# Patient Record
Sex: FEMALE | Race: WHITE | NOT HISPANIC OR LATINO
[De-identification: names, ages, dates, MRNs, and addresses within clinical notes are randomized per-mention and may not be internally consistent; named-entity substitution may affect disease eponyms.]

---

## 2017-03-29 ENCOUNTER — APPOINTMENT (OUTPATIENT)
Dept: RADIOLOGY | Facility: CLINIC | Age: 72
End: 2017-03-29

## 2017-04-03 ENCOUNTER — APPOINTMENT (OUTPATIENT)
Dept: RADIOLOGY | Facility: CLINIC | Age: 72
End: 2017-04-03

## 2017-04-03 ENCOUNTER — OUTPATIENT (OUTPATIENT)
Dept: OUTPATIENT SERVICES | Facility: HOSPITAL | Age: 72
LOS: 1 days | End: 2017-04-03
Payer: MEDICARE

## 2017-04-03 DIAGNOSIS — Z00.8 ENCOUNTER FOR OTHER GENERAL EXAMINATION: ICD-10-CM

## 2017-04-03 PROCEDURE — 77080 DXA BONE DENSITY AXIAL: CPT

## 2017-04-13 ENCOUNTER — APPOINTMENT (OUTPATIENT)
Dept: ELECTROPHYSIOLOGY | Facility: CLINIC | Age: 72
End: 2017-04-13

## 2017-04-13 VITALS
BODY MASS INDEX: 24.16 KG/M2 | HEIGHT: 65 IN | SYSTOLIC BLOOD PRESSURE: 128 MMHG | DIASTOLIC BLOOD PRESSURE: 66 MMHG | WEIGHT: 145 LBS | HEART RATE: 69 BPM

## 2017-04-13 DIAGNOSIS — D32.9 BENIGN NEOPLASM OF MENINGES, UNSPECIFIED: ICD-10-CM

## 2017-04-13 DIAGNOSIS — Z86.69 PERSONAL HISTORY OF OTHER DISEASES OF THE NERVOUS SYSTEM AND SENSE ORGANS: ICD-10-CM

## 2017-04-13 DIAGNOSIS — Z87.891 PERSONAL HISTORY OF NICOTINE DEPENDENCE: ICD-10-CM

## 2017-04-13 DIAGNOSIS — Z87.898 PERSONAL HISTORY OF OTHER SPECIFIED CONDITIONS: ICD-10-CM

## 2018-04-13 ENCOUNTER — APPOINTMENT (OUTPATIENT)
Dept: ELECTROPHYSIOLOGY | Facility: CLINIC | Age: 73
End: 2018-04-13

## 2018-04-13 VITALS — WEIGHT: 185 LBS | HEIGHT: 64 IN | BODY MASS INDEX: 31.58 KG/M2

## 2018-06-19 ENCOUNTER — OUTPATIENT (OUTPATIENT)
Dept: OUTPATIENT SERVICES | Facility: HOSPITAL | Age: 73
LOS: 1 days | End: 2018-06-19
Payer: MEDICARE

## 2018-06-19 ENCOUNTER — APPOINTMENT (OUTPATIENT)
Dept: RADIOLOGY | Facility: CLINIC | Age: 73
End: 2018-06-19
Payer: MEDICARE

## 2018-06-19 DIAGNOSIS — Z00.8 ENCOUNTER FOR OTHER GENERAL EXAMINATION: ICD-10-CM

## 2018-06-19 PROCEDURE — 77080 DXA BONE DENSITY AXIAL: CPT | Mod: 26

## 2018-06-19 PROCEDURE — 77080 DXA BONE DENSITY AXIAL: CPT

## 2019-06-05 ENCOUNTER — APPOINTMENT (OUTPATIENT)
Dept: ELECTROPHYSIOLOGY | Facility: CLINIC | Age: 74
End: 2019-06-05

## 2019-06-05 VITALS — SYSTOLIC BLOOD PRESSURE: 130 MMHG | DIASTOLIC BLOOD PRESSURE: 80 MMHG | HEART RATE: 63 BPM | HEIGHT: 64 IN

## 2019-06-28 ENCOUNTER — NON-APPOINTMENT (OUTPATIENT)
Age: 74
End: 2019-06-28

## 2019-06-28 ENCOUNTER — APPOINTMENT (OUTPATIENT)
Dept: ELECTROPHYSIOLOGY | Facility: CLINIC | Age: 74
End: 2019-06-28
Payer: MEDICARE

## 2019-06-28 VITALS
SYSTOLIC BLOOD PRESSURE: 138 MMHG | BODY MASS INDEX: 31.58 KG/M2 | HEART RATE: 68 BPM | WEIGHT: 185 LBS | DIASTOLIC BLOOD PRESSURE: 77 MMHG | HEIGHT: 64 IN

## 2019-06-28 PROCEDURE — 99214 OFFICE O/P EST MOD 30 MIN: CPT

## 2019-06-28 PROCEDURE — 93000 ELECTROCARDIOGRAM COMPLETE: CPT

## 2019-06-28 RX ORDER — CINNAMON BARK 500 MG
CAPSULE ORAL
Refills: 0 | Status: DISCONTINUED | COMMUNITY
End: 2019-06-28

## 2019-06-28 NOTE — PHYSICAL EXAM
[General Appearance - Well Developed] : well developed [Normal Appearance] : normal appearance [Well Groomed] : well groomed [No Deformities] : no deformities [General Appearance - Well Nourished] : well nourished [Normal Conjunctiva] : the conjunctiva exhibited no abnormalities [General Appearance - In No Acute Distress] : no acute distress [Eyelids - No Xanthelasma] : the eyelids demonstrated no xanthelasmas [Normal Oral Mucosa] : normal oral mucosa [No Oral Cyanosis] : no oral cyanosis [Normal Jugular Venous A Waves Present] : normal jugular venous A waves present [No Oral Pallor] : no oral pallor [Normal Jugular Venous V Waves Present] : normal jugular venous V waves present [No Jugular Venous Gonsalez A Waves] : no jugular venous gonsalez A waves [Respiration, Rhythm And Depth] : normal respiratory rhythm and effort [Auscultation Breath Sounds / Voice Sounds] : lungs were clear to auscultation bilaterally [Exaggerated Use Of Accessory Muscles For Inspiration] : no accessory muscle use [Murmurs] : no murmurs present [Heart Sounds] : normal S1 and S2 [Heart Rate And Rhythm] : heart rate and rhythm were normal [Abdomen Mass (___ Cm)] : no abdominal mass palpated [Abdomen Soft] : soft [Abdomen Tenderness] : non-tender [Gait - Sufficient For Exercise Testing] : the gait was sufficient for exercise testing [Abnormal Walk] : normal gait [Nail Clubbing] : no clubbing of the fingernails [Petechial Hemorrhages (___cm)] : no petechial hemorrhages [Cyanosis, Localized] : no localized cyanosis [] : no rash [No Venous Stasis] : no venous stasis [Skin Color & Pigmentation] : normal skin color and pigmentation [Skin Lesions] : no skin lesions [No Skin Ulcers] : no skin ulcer [No Xanthoma] : no  xanthoma was observed [Oriented To Time, Place, And Person] : oriented to person, place, and time [Mood] : the mood was normal [No Anxiety] : not feeling anxious [Affect] : the affect was normal

## 2019-07-29 NOTE — ASSESSMENT
DISCHARGE PLANNING     Discharge to home or other facility with appropriate resources Progressing        INFECTION - ADULT     Absence or prevention of progression during hospitalization Progressing        Knowledge Deficit     Patient/family/caregiver demonstrates understanding of disease process, treatment plan, medications, and discharge instructions Progressing        PAIN - ADULT     Verbalizes/displays adequate comfort level or baseline comfort level Progressing        Potential for Falls     Patient will remain free of falls Progressing        SAFETY ADULT     Maintain or return to baseline ADL function Progressing     Maintain or return mobility status to optimal level Progressing [FreeTextEntry1] : 74 year old female who has palpitations secondary to PACs. Lifestyle modification and beta blocker therapy have been successful in suppressing her symptoms.\par \par At this time would recommend continuation of her baby aspirin and atenolol.

## 2019-07-29 NOTE — HISTORY OF PRESENT ILLNESS
[FreeTextEntry1] : This is a 74-year-old woman with a history of near syncope and palpitations. Evaluation in the past has included Holter monitors that demonstrated her palpitations are associated with single PACs. She presents today for followup feeling well. She reports that she's having infrequent palpitation. LOLITA MORALES  denies chest pain, chest pressure, shortness of breath, lightheadedness, dizziness, syncope, presyncope, orthopnea, PND, or edema..\par

## 2020-06-19 ENCOUNTER — APPOINTMENT (OUTPATIENT)
Dept: ELECTROPHYSIOLOGY | Facility: CLINIC | Age: 75
End: 2020-06-19

## 2020-07-20 ENCOUNTER — APPOINTMENT (OUTPATIENT)
Dept: RADIOLOGY | Facility: CLINIC | Age: 75
End: 2020-07-20
Payer: MEDICARE

## 2020-07-20 ENCOUNTER — OUTPATIENT (OUTPATIENT)
Dept: OUTPATIENT SERVICES | Facility: HOSPITAL | Age: 75
LOS: 1 days | End: 2020-07-20
Payer: MEDICARE

## 2020-07-20 DIAGNOSIS — Z00.8 ENCOUNTER FOR OTHER GENERAL EXAMINATION: ICD-10-CM

## 2020-07-20 PROCEDURE — 77080 DXA BONE DENSITY AXIAL: CPT

## 2020-07-20 PROCEDURE — 77080 DXA BONE DENSITY AXIAL: CPT | Mod: 26

## 2020-07-29 NOTE — OBJECTIVE
regarding LHC/patient [History reviewed] : History reviewed. [Medications and Allergies reviewed] : Medications and allergies reviewed.

## 2020-12-01 ENCOUNTER — NON-APPOINTMENT (OUTPATIENT)
Age: 75
End: 2020-12-01

## 2020-12-01 ENCOUNTER — APPOINTMENT (OUTPATIENT)
Dept: ELECTROPHYSIOLOGY | Facility: CLINIC | Age: 75
End: 2020-12-01
Payer: MEDICARE

## 2020-12-01 VITALS
HEART RATE: 64 BPM | BODY MASS INDEX: 24.75 KG/M2 | SYSTOLIC BLOOD PRESSURE: 145 MMHG | WEIGHT: 145 LBS | OXYGEN SATURATION: 99 % | HEIGHT: 64 IN | DIASTOLIC BLOOD PRESSURE: 79 MMHG

## 2020-12-01 PROCEDURE — 93000 ELECTROCARDIOGRAM COMPLETE: CPT

## 2020-12-01 PROCEDURE — 99214 OFFICE O/P EST MOD 30 MIN: CPT

## 2020-12-01 NOTE — PHYSICAL EXAM
[General Appearance - Well Developed] : well developed [Normal Appearance] : normal appearance [Well Groomed] : well groomed [General Appearance - Well Nourished] : well nourished [No Deformities] : no deformities [General Appearance - In No Acute Distress] : no acute distress [Normal Conjunctiva] : the conjunctiva exhibited no abnormalities [Eyelids - No Xanthelasma] : the eyelids demonstrated no xanthelasmas [Normal Oral Mucosa] : normal oral mucosa [No Oral Pallor] : no oral pallor [No Oral Cyanosis] : no oral cyanosis [Normal Jugular Venous A Waves Present] : normal jugular venous A waves present [Normal Jugular Venous V Waves Present] : normal jugular venous V waves present [No Jugular Venous Gonsalez A Waves] : no jugular venous gonsalez A waves [Respiration, Rhythm And Depth] : normal respiratory rhythm and effort [Exaggerated Use Of Accessory Muscles For Inspiration] : no accessory muscle use [Auscultation Breath Sounds / Voice Sounds] : lungs were clear to auscultation bilaterally [Heart Rate And Rhythm] : heart rate and rhythm were normal [Heart Sounds] : normal S1 and S2 [Murmurs] : no murmurs present [Abdomen Soft] : soft [Abdomen Tenderness] : non-tender [Abdomen Mass (___ Cm)] : no abdominal mass palpated [Abnormal Walk] : normal gait [Gait - Sufficient For Exercise Testing] : the gait was sufficient for exercise testing [Nail Clubbing] : no clubbing of the fingernails [Cyanosis, Localized] : no localized cyanosis [Petechial Hemorrhages (___cm)] : no petechial hemorrhages [Skin Color & Pigmentation] : normal skin color and pigmentation [] : no rash [No Venous Stasis] : no venous stasis [Skin Lesions] : no skin lesions [No Skin Ulcers] : no skin ulcer [No Xanthoma] : no  xanthoma was observed [Oriented To Time, Place, And Person] : oriented to person, place, and time [Affect] : the affect was normal [Mood] : the mood was normal [No Anxiety] : not feeling anxious

## 2020-12-01 NOTE — ASSESSMENT
[FreeTextEntry1] : 75 year old female who has palpitations secondary to PACs. Lifestyle modification and beta blocker therapy have been successful in suppressing her symptoms. She has had an Echo with Dr. Arzate within last year. \par \par At this time would recommend continuation of her baby aspirin and atenolol.

## 2021-02-04 ENCOUNTER — APPOINTMENT (OUTPATIENT)
Dept: GASTROENTEROLOGY | Facility: CLINIC | Age: 76
End: 2021-02-04
Payer: MEDICARE

## 2021-02-04 VITALS
SYSTOLIC BLOOD PRESSURE: 152 MMHG | DIASTOLIC BLOOD PRESSURE: 76 MMHG | BODY MASS INDEX: 24.59 KG/M2 | HEART RATE: 76 BPM | WEIGHT: 144 LBS | HEIGHT: 64 IN

## 2021-02-04 DIAGNOSIS — Z12.11 ENCOUNTER FOR SCREENING FOR MALIGNANT NEOPLASM OF COLON: ICD-10-CM

## 2021-02-04 DIAGNOSIS — Z86.010 PERSONAL HISTORY OF COLONIC POLYPS: ICD-10-CM

## 2021-02-04 PROCEDURE — 99202 OFFICE O/P NEW SF 15 MIN: CPT

## 2021-02-04 NOTE — HISTORY OF PRESENT ILLNESS
[de-identified] : Ms. LOLITA MORALES is a 75 year old female presents for screening colonoscopy. Patient has no complaints of bowel issues, bleeding, abdominal pain, family history of colon cancer, GERD symptoms. Patient had last colonoscopy several years ago. Patient reports personal history of colon polyps.\par \par

## 2021-03-28 ENCOUNTER — APPOINTMENT (OUTPATIENT)
Dept: DISASTER EMERGENCY | Facility: CLINIC | Age: 76
End: 2021-03-28

## 2021-03-28 DIAGNOSIS — Z01.818 ENCOUNTER FOR OTHER PREPROCEDURAL EXAMINATION: ICD-10-CM

## 2021-03-28 LAB — SARS-COV-2 N GENE NPH QL NAA+PROBE: NOT DETECTED

## 2021-04-01 ENCOUNTER — APPOINTMENT (OUTPATIENT)
Dept: GASTROENTEROLOGY | Facility: AMBULATORY MEDICAL SERVICES | Age: 76
End: 2021-04-01
Payer: MEDICARE

## 2021-04-01 PROCEDURE — 45378 DIAGNOSTIC COLONOSCOPY: CPT

## 2021-09-27 ENCOUNTER — APPOINTMENT (OUTPATIENT)
Dept: NEUROSURGERY | Facility: CLINIC | Age: 76
End: 2021-09-27
Payer: MEDICARE

## 2021-09-27 VITALS
TEMPERATURE: 97.5 F | HEART RATE: 75 BPM | WEIGHT: 139.31 LBS | DIASTOLIC BLOOD PRESSURE: 83 MMHG | HEIGHT: 64 IN | SYSTOLIC BLOOD PRESSURE: 152 MMHG | OXYGEN SATURATION: 99 % | BODY MASS INDEX: 23.78 KG/M2

## 2021-09-27 DIAGNOSIS — H04.031: ICD-10-CM

## 2021-09-27 DIAGNOSIS — D32.1 BENIGN NEOPLASM OF SPINAL MENINGES: ICD-10-CM

## 2021-09-27 DIAGNOSIS — D32.0 BENIGN NEOPLASM OF CEREBRAL MENINGES: ICD-10-CM

## 2021-09-27 PROCEDURE — 99203 OFFICE O/P NEW LOW 30 MIN: CPT

## 2022-01-23 PROBLEM — D32.1: Status: ACTIVE | Noted: 2022-01-23

## 2022-01-23 PROBLEM — D32.0 INTRACRANIAL MENINGIOMA: Status: ACTIVE | Noted: 2022-01-23

## 2022-01-23 PROBLEM — H04.031: Status: ACTIVE | Noted: 2022-01-23

## 2022-01-23 NOTE — REASON FOR VISIT
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

## 2022-01-23 NOTE — CONSULT LETTER
[Dear  ___] : Dear  [unfilled], [Courtesy Letter:] : I had the pleasure of seeing your patient, [unfilled], in my office today. 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  The patient indicates that she had an expanding intradural mass within the thoracic spinal cord consistent with a meningioma that required surgical resection which was performed at [facility] back in August 4, 1998 without complication.  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  The patient indicates that no additional adjunct of treatment such as radiation was performed.  The patient has been following on an ongoing basis with her neurologist Dr. Juarez who has retired from active clinical practice.  The patient indicates that she is doing well with no problems with walking or going up or down stairs.  She denies any sleep issues.  She has not had any difficulties with balance and bowel and bladder function remain normal.  The patient is slightly concerned regarding her most recent MRI scan of the thoracic spine performed at [facility] which suggest there may have been small progressive change when compared to previous images in 2017.\par \par I have reviewed with the patient directly her MRI scan performed in 2017 as well as the new images performed in September 2021.  I have reviewed the reports which indicate no change between the images of February 2019 and the most recent images.  I have pointed out to the patient the surgical site where a T2 and T3 posterior laminectomy procedure has been performed and there is evidence of surgical resection toward the left-hand side of the spinal cord.  Per my examination I do not appreciate any significant changes between the images from 2017, 2019, and 2021.  There is certainly some material to the left-hand side of the cord with slight deflection of the spinal cord that also shows some minor enhancement.  It is not clear however if this may represent's post surgical closure material such as muscle.  We do not have access to the actual surgical operative report to understand if this may be stable since surgery.  The patient did indicate she had access to previous images from approximately 10 years ago and she will provide those to us.  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  The patient is aware and I will defer to a ophthalmology or orbital surgeon for consideration of appropriate intervention if necessary.  There is also a very small right paraclinoid meningioma which is stable and does not cause compromise of the optic nerve or the final segment of the internal carotid artery in the cavernous portion.\par \par On examination the patient is ambulating well.  The patient does not have a significant kyphotic defect.  The reflexes are symmetric and normal without clonus.  The patient has intact strength and sensation in the lower extremities.\par \par Currently I would not recommend any aggressive intervention with respect to the thoracic spine.  The patient denies any acute clinical problems.  I will review the MRI scan from approximately 10 years ago once that is available for us.  In the meantime I recommend an ongoing surveillance program with respect to the cranial and thoracic regions.  I will have the patient undergo a routine MRI scan in 1 year.  The patient indicated good understanding and is in agreement with an ongoing supportive conservative approach to care.\par \par Thank you for very kindly including me in the evaluation and support of your patient.  Please do not hesitate to contact me should you have any questions or concerns regarding her diagnosis of a post surgical resection thoracic meningioma or a right paraclinoid intracranial meningioma which will be followed conservatively.\par \par \par \par \par  [FreeTextEntry3] : Geoffrey Houston MD, PhD, FRCPSC \par Attending Neurosurgeon \par  of Neurosurgery \par [facility] \par [address], [address] floor \[place] [address] \par Office: [phone] \par Fax: [phone]\par \par

## 2022-01-23 NOTE — ADDENDUM
[FreeTextEntry1] : I have reviewed an MRI scan of the thoracic spine without contrast that was performed at [facility] in July 2011.  I do not see significant change in the postsurgical field at T2 and T3.  Once again there is evidence of material toward the left-hand side of the spinal cord with slight deflection of the spinal cord without evidence of myelomalacia.

## 2022-04-19 ENCOUNTER — NON-APPOINTMENT (OUTPATIENT)
Age: 77
End: 2022-04-19

## 2022-04-19 ENCOUNTER — APPOINTMENT (OUTPATIENT)
Dept: ELECTROPHYSIOLOGY | Facility: CLINIC | Age: 77
End: 2022-04-19
Payer: MEDICARE

## 2022-04-19 VITALS
OXYGEN SATURATION: 100 % | BODY MASS INDEX: 23.9 KG/M2 | DIASTOLIC BLOOD PRESSURE: 83 MMHG | HEART RATE: 71 BPM | HEIGHT: 64 IN | WEIGHT: 140 LBS | SYSTOLIC BLOOD PRESSURE: 150 MMHG | RESPIRATION RATE: 16 BRPM

## 2022-04-19 PROCEDURE — 99214 OFFICE O/P EST MOD 30 MIN: CPT

## 2022-04-19 PROCEDURE — 93000 ELECTROCARDIOGRAM COMPLETE: CPT

## 2022-04-19 RX ORDER — POLYETHYLENE GLYCOL 3350, SODIUM SULFATE, SODIUM CHLORIDE, POTASSIUM CHLORIDE, ASCORBIC ACID, SODIUM ASCORBATE 140-9-5.2G
140 KIT ORAL
Qty: 1 | Refills: 0 | Status: DISCONTINUED | COMMUNITY
Start: 2021-02-04 | End: 2022-04-19

## 2022-05-02 NOTE — ASSESSMENT
[FreeTextEntry1] : \par 77 year old female who has palpitations secondary to PACs. Lifestyle modification and beta blocker therapy have been successful in suppressing her symptoms. She has had an Echo with Dr. Arzate within last year. \par \par At this time would recommend continuation of her baby aspirin and atenolol 12.5 mg BID.\par \par \par Notes from Dr. Arzate 8/3/21, 4/26/21 as well as carotid doppler, stress test reviewed. \par \par

## 2022-05-02 NOTE — HISTORY OF PRESENT ILLNESS
[FreeTextEntry1] : This is a 77-year-old woman with a history of near syncope and palpitations. Evaluation in the past has included Holter monitors that demonstrated her palpitations are associated with single PACs. She presents today for followup feeling well. She reports that she's having infrequent palpitation. LOLITA MORALES denies chest pain, chest pressure, shortness of breath, lightheadedness, dizziness, syncope, presyncope, orthopnea, PND, or edema..

## 2022-09-23 ENCOUNTER — NON-APPOINTMENT (OUTPATIENT)
Age: 77
End: 2022-09-23

## 2022-09-23 DIAGNOSIS — Z78.0 ASYMPTOMATIC MENOPAUSAL STATE: ICD-10-CM

## 2022-09-23 DIAGNOSIS — Z87.898 PERSONAL HISTORY OF OTHER SPECIFIED CONDITIONS: ICD-10-CM

## 2022-09-23 DIAGNOSIS — Z98.890 OTHER SPECIFIED POSTPROCEDURAL STATES: ICD-10-CM

## 2022-09-23 DIAGNOSIS — M85.80 OTHER SPECIFIED DISORDERS OF BONE DENSITY AND STRUCTURE, UNSPECIFIED SITE: ICD-10-CM

## 2022-09-23 DIAGNOSIS — Z86.39 PERSONAL HISTORY OF OTHER ENDOCRINE, NUTRITIONAL AND METABOLIC DISEASE: ICD-10-CM

## 2022-09-23 DIAGNOSIS — Z92.89 PERSONAL HISTORY OF OTHER MEDICAL TREATMENT: ICD-10-CM

## 2022-09-23 DIAGNOSIS — Z23 ENCOUNTER FOR IMMUNIZATION: ICD-10-CM

## 2022-09-23 DIAGNOSIS — M81.0 AGE-RELATED OSTEOPOROSIS W/OUT CURRENT PATHOLOGICAL FRACTURE: ICD-10-CM

## 2022-10-04 DIAGNOSIS — N95.2 POSTMENOPAUSAL ATROPHIC VAGINITIS: ICD-10-CM

## 2022-10-05 ENCOUNTER — APPOINTMENT (OUTPATIENT)
Dept: OBGYN | Facility: CLINIC | Age: 77
End: 2022-10-05

## 2022-10-05 ENCOUNTER — RESULT CHARGE (OUTPATIENT)
Age: 77
End: 2022-10-05

## 2022-10-05 VITALS
WEIGHT: 140 LBS | BODY MASS INDEX: 23.9 KG/M2 | SYSTOLIC BLOOD PRESSURE: 132 MMHG | HEIGHT: 64 IN | HEART RATE: 71 BPM | DIASTOLIC BLOOD PRESSURE: 77 MMHG

## 2022-10-05 LAB
BILIRUB UR QL STRIP: NORMAL
CLARITY UR: CLEAR
COLLECTION METHOD: NORMAL
GLUCOSE UR-MCNC: NORMAL
HCG UR QL: 0.2 EU/DL
HEMOGLOBIN: 11.9
HGB UR QL STRIP.AUTO: NORMAL
KETONES UR-MCNC: NORMAL
LEUKOCYTE ESTERASE UR QL STRIP: NORMAL
NITRITE UR QL STRIP: NORMAL
PH UR STRIP: 7
PROT UR STRIP-MCNC: NORMAL
SP GR UR STRIP: 1.01

## 2022-10-05 PROCEDURE — 81003 URINALYSIS AUTO W/O SCOPE: CPT | Mod: QW

## 2022-10-05 PROCEDURE — 85018 HEMOGLOBIN: CPT | Mod: QW

## 2022-10-05 PROCEDURE — G0101: CPT

## 2022-10-06 NOTE — DISCUSSION/SUMMARY
[FreeTextEntry1] : Daughter in law has multiple myeloma lives in [place]\par Sister was diagnosed with uterine CA.

## 2022-10-06 NOTE — PLAN
[FreeTextEntry1] : \par \par Patient to follow up in 1 year for annual GYN exam\par Mammogram:  12/22 Rx given \par Colonoscopy due:followed by GI  she had one last year which was negative per GI not needed anymore\par Bone density due: Per Dr. Diehl\par \par Pap ordered\par Hemoccult ordered \par All questions answered, patient agreeable with plan.\par \par \par Written by Lola WRIGHT, acting as a scribe for Dr. Buckner. This note accurately reflects the work and decisions made by me.\par

## 2022-10-06 NOTE — HISTORY OF PRESENT ILLNESS
[TextBox_4] : Patient is a 76y/o female here today for annual visit. Patient has hx of osteoporosis on boniva for 10 years and has been off for a while. She is followed by [facility] for her bone density. She is currently not being treated for osteoporosis. \par She has hx of colon polyps mosesen. \par \par Denies any complaints at this time.

## 2022-10-06 NOTE — PHYSICAL EXAM
[Chaperone Present] : A chaperone was present in the examining room during all aspects of the physical examination [Appropriately responsive] : appropriately responsive [Alert] : alert [No Lymphadenopathy] : no lymphadenopathy [Soft] : soft [Non-tender] : non-tender [Oriented x3] : oriented x3 [Examination Of The Breasts] : a normal appearance [No Discharge] : no discharge [No Masses] : no breast masses were palpable [Labia Majora] : normal [Labia Minora] : normal [Normal] : normal [Atrophy] : atrophy [Absent] : absent [Uterine Adnexae] : normal [FreeTextEntry1] : Lola RANDOLPH-MIGUEL chaperoned during entire physical exam [Occult Blood Positive] : was negative for occult blood analysis

## 2022-10-10 LAB — CYTOLOGY CVX/VAG DOC THIN PREP: ABNORMAL

## 2022-11-03 ENCOUNTER — NON-APPOINTMENT (OUTPATIENT)
Age: 77
End: 2022-11-03

## 2022-11-03 ENCOUNTER — APPOINTMENT (OUTPATIENT)
Dept: ELECTROPHYSIOLOGY | Facility: CLINIC | Age: 77
End: 2022-11-03
Payer: MEDICARE

## 2022-11-03 VITALS
WEIGHT: 140 LBS | HEIGHT: 64 IN | DIASTOLIC BLOOD PRESSURE: 79 MMHG | OXYGEN SATURATION: 100 % | BODY MASS INDEX: 23.9 KG/M2 | HEART RATE: 64 BPM | SYSTOLIC BLOOD PRESSURE: 159 MMHG

## 2022-11-03 PROCEDURE — 93000 ELECTROCARDIOGRAM COMPLETE: CPT | Mod: 59

## 2022-11-03 PROCEDURE — 99214 OFFICE O/P EST MOD 30 MIN: CPT

## 2022-11-03 RX ORDER — LOSARTAN POTASSIUM 100 MG/1
TABLET, FILM COATED ORAL DAILY
Refills: 0 | Status: DISCONTINUED | COMMUNITY
End: 2022-11-03

## 2022-11-03 RX ORDER — ASPIRIN 81 MG
81 TABLET,CHEWABLE ORAL
Refills: 0 | Status: ACTIVE | COMMUNITY

## 2022-12-06 NOTE — HISTORY OF PRESENT ILLNESS
[FreeTextEntry1] : This is a 77-year-old woman with a history of near syncope and palpitations. Evaluation in the past has included Holter monitors that demonstrated her palpitations are associated with single PACs. She presents today for followup feeling well. She reports that she's having infrequent palpitation sometimes associated with lightheadedness.. LOLITA MORALES denies chest pain, chest pressure, shortness of breath, lightheadedness, dizziness, syncope, presyncope, orthopnea, PND, or edema..

## 2022-12-06 NOTE — ASSESSMENT
[FreeTextEntry1] : \par 77 year old female who has palpitations secondary to PACs. Lifestyle modification and beta blocker therapy have been successful in suppressing her symptoms. She has had an Echo with Dr. Arzate within last year. \par \par At this time would recommend continuation of her baby aspirin and atenolol 12.5 mg BID.\par \par \par Notes from Dudley Galeano and lab results reviewed. \par

## 2023-01-04 ENCOUNTER — APPOINTMENT (OUTPATIENT)
Dept: ELECTROPHYSIOLOGY | Facility: CLINIC | Age: 78
End: 2023-01-04
Payer: MEDICARE

## 2023-01-04 VITALS
RESPIRATION RATE: 16 BRPM | HEIGHT: 64 IN | BODY MASS INDEX: 23.9 KG/M2 | WEIGHT: 140 LBS | DIASTOLIC BLOOD PRESSURE: 85 MMHG | OXYGEN SATURATION: 100 % | SYSTOLIC BLOOD PRESSURE: 147 MMHG | HEART RATE: 83 BPM

## 2023-01-04 PROCEDURE — 99214 OFFICE O/P EST MOD 30 MIN: CPT

## 2023-01-26 NOTE — HISTORY OF PRESENT ILLNESS
[FreeTextEntry1] : This is a 77-year-old woman with a history of near syncope and palpitations. Evaluation in the past has included Holter monitors that demonstrated her palpitations are associated with single PACs. She presents today for followup feeling well. She reports that she's having infrequent palpitation.She is under stress due to the passing of her younger sister after cancer surgery.  LOLITA MORALES denies chest pain, chest pressure, shortness of breath, lightheadedness, dizziness, syncope, presyncope, orthopnea, PND, or edema..

## 2023-01-26 NOTE — ASSESSMENT
[FreeTextEntry1] : \par 77 year old female who has palpitations secondary to PACs. Lifestyle modification and beta blocker therapy have been successful in suppressing her symptoms. She has had an Echo with Dr. Arzate within last year. \par \par At this time would recommend continuation of her baby aspirin and atenolol 12.5 mg BID.\par \par \par \par \par

## 2023-07-13 ENCOUNTER — NON-APPOINTMENT (OUTPATIENT)
Age: 78
End: 2023-07-13

## 2023-07-13 ENCOUNTER — APPOINTMENT (OUTPATIENT)
Dept: ELECTROPHYSIOLOGY | Facility: CLINIC | Age: 78
End: 2023-07-13
Payer: MEDICARE

## 2023-07-13 VITALS
SYSTOLIC BLOOD PRESSURE: 138 MMHG | DIASTOLIC BLOOD PRESSURE: 77 MMHG | RESPIRATION RATE: 16 BRPM | WEIGHT: 140 LBS | BODY MASS INDEX: 23.9 KG/M2 | HEART RATE: 62 BPM | HEIGHT: 64 IN | OXYGEN SATURATION: 100 %

## 2023-07-13 DIAGNOSIS — E03.9 HYPOTHYROIDISM, UNSPECIFIED: ICD-10-CM

## 2023-07-13 PROCEDURE — 93000 ELECTROCARDIOGRAM COMPLETE: CPT

## 2023-07-13 PROCEDURE — 99213 OFFICE O/P EST LOW 20 MIN: CPT

## 2023-07-13 RX ORDER — RED YEAST RICE EXTRACT
POWDER (GRAM) MISCELLANEOUS
Refills: 0 | Status: ACTIVE | COMMUNITY

## 2023-07-13 RX ORDER — LEVOTHYROXINE SODIUM 25 UG/1
25 TABLET ORAL DAILY
Refills: 0 | Status: ACTIVE | COMMUNITY

## 2023-07-13 RX ORDER — CRANBERRY FRUIT EXTRACT 12:1
POWDER (GRAM) MISCELLANEOUS
Refills: 0 | Status: ACTIVE | COMMUNITY

## 2023-07-13 RX ORDER — BIMATOPROST 0.1 MG/ML
SOLUTION/ DROPS OPHTHALMIC
Refills: 0 | Status: ACTIVE | COMMUNITY

## 2023-07-13 RX ORDER — UBIDECARENONE 200 MG
200 CAPSULE ORAL
Refills: 0 | Status: ACTIVE | COMMUNITY

## 2023-07-13 RX ORDER — MULTIVIT-MIN/FOLIC/VIT K/LYCOP 400-300MCG
1000 TABLET ORAL
Refills: 0 | Status: ACTIVE | COMMUNITY

## 2023-07-13 RX ORDER — UBIDECARENONE/VIT E ACET 100MG-5
50 MCG CAPSULE ORAL
Refills: 0 | Status: ACTIVE | COMMUNITY

## 2023-07-21 NOTE — ASSESSMENT
[FreeTextEntry1] : 77 year old female who has palpitations secondary to PACs. Lifestyle modification and beta blocker therapy have been successful in suppressing her symptoms. She has had an Echo with Dr. Arzate within last year. \par \par At this time would recommend continuation of her baby aspirin and atenolol 37.5 mg daily. \par \par \par \par \par

## 2023-07-21 NOTE — HISTORY OF PRESENT ILLNESS
[FreeTextEntry1] : This is a 78-year-old woman with a history of near syncope and palpitations. Evaluation in the past has included Holter monitors that demonstrated her palpitations are associated with single PACs. She presents today for followup feeling well. She reports that she's having infrequent palpitation.She is under stress due to the passing of her younger sister after cancer surgery.  LOLITA MORALES denies chest pain, chest pressure, shortness of breath, lightheadedness, dizziness, syncope, presyncope, orthopnea, PND, or edema\par \par Walks daily each morning.

## 2023-08-21 ENCOUNTER — RX RENEWAL (OUTPATIENT)
Age: 78
End: 2023-08-21

## 2023-10-25 ENCOUNTER — RX RENEWAL (OUTPATIENT)
Age: 78
End: 2023-10-25

## 2023-12-19 ENCOUNTER — RX RENEWAL (OUTPATIENT)
Age: 78
End: 2023-12-19

## 2024-02-02 ENCOUNTER — NON-APPOINTMENT (OUTPATIENT)
Age: 79
End: 2024-02-02

## 2024-02-02 ENCOUNTER — APPOINTMENT (OUTPATIENT)
Dept: ELECTROPHYSIOLOGY | Facility: CLINIC | Age: 79
End: 2024-02-02
Payer: MEDICARE

## 2024-02-02 VITALS
BODY MASS INDEX: 24.75 KG/M2 | DIASTOLIC BLOOD PRESSURE: 79 MMHG | HEART RATE: 72 BPM | OXYGEN SATURATION: 99 % | WEIGHT: 145 LBS | SYSTOLIC BLOOD PRESSURE: 122 MMHG | HEIGHT: 64 IN

## 2024-02-02 DIAGNOSIS — Z82.49 FAMILY HISTORY OF ISCHEMIC HEART DISEASE AND OTHER DISEASES OF THE CIRCULATORY SYSTEM: ICD-10-CM

## 2024-02-02 DIAGNOSIS — R00.2 PALPITATIONS: ICD-10-CM

## 2024-02-02 DIAGNOSIS — I10 ESSENTIAL (PRIMARY) HYPERTENSION: ICD-10-CM

## 2024-02-02 PROCEDURE — 99213 OFFICE O/P EST LOW 20 MIN: CPT

## 2024-02-02 PROCEDURE — 93000 ELECTROCARDIOGRAM COMPLETE: CPT

## 2024-02-02 PROCEDURE — G2211 COMPLEX E/M VISIT ADD ON: CPT

## 2024-02-15 ENCOUNTER — RX RENEWAL (OUTPATIENT)
Age: 79
End: 2024-02-15

## 2024-02-23 ENCOUNTER — RX RENEWAL (OUTPATIENT)
Age: 79
End: 2024-02-23

## 2024-03-11 PROBLEM — I10 ESSENTIAL (PRIMARY) HYPERTENSION: Status: ACTIVE | Noted: 2022-09-23

## 2024-03-11 PROBLEM — R00.2 PALPITATIONS: Status: ACTIVE | Noted: 2017-04-13

## 2024-03-11 NOTE — HISTORY OF PRESENT ILLNESS
[FreeTextEntry1] : This is a 78-year-old woman with a history of near syncope and palpitations. Evaluation in the past has included Holter monitors that demonstrated her palpitations are associated with single PACs. She presents today for follow-up feeling well. She reports that she's having infrequent palpitation. She is under stress  had prostate surgery.  LOLITA MORALES denies chest pain, chest pressure, shortness of breath, lightheadedness, dizziness, syncope, presyncope, orthopnea, PND, or edema. Walks daily each morning.  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

## 2024-03-11 NOTE — ASSESSMENT
[FreeTextEntry1] : 78 year old female who has palpitations secondary to PACs. Lifestyle modification and beta blocker therapy have been successful in suppressing her symptoms. She has had an Echo with Dr. Arzate within last year.   Continue aspirin 81 mg and atenolol 37.5 mg daily.

## 2024-04-17 PROBLEM — Z12.4 CERVICAL CANCER SCREENING: Status: ACTIVE | Noted: 2024-04-17

## 2024-04-17 PROBLEM — N95.2 VAGINAL ATROPHY: Status: ACTIVE | Noted: 2024-04-17

## 2024-04-24 ENCOUNTER — RESULT CHARGE (OUTPATIENT)
Age: 79
End: 2024-04-24

## 2024-04-24 ENCOUNTER — APPOINTMENT (OUTPATIENT)
Dept: OBGYN | Facility: CLINIC | Age: 79
End: 2024-04-24
Payer: MEDICARE

## 2024-04-24 VITALS — SYSTOLIC BLOOD PRESSURE: 119 MMHG | DIASTOLIC BLOOD PRESSURE: 77 MMHG

## 2024-04-24 VITALS — BODY MASS INDEX: 25.4 KG/M2 | WEIGHT: 148 LBS

## 2024-04-24 DIAGNOSIS — Z12.4 ENCOUNTER FOR SCREENING FOR MALIGNANT NEOPLASM OF CERVIX: ICD-10-CM

## 2024-04-24 DIAGNOSIS — Z00.00 ENCOUNTER FOR GENERAL ADULT MEDICAL EXAMINATION W/OUT ABNORMAL FINDINGS: ICD-10-CM

## 2024-04-24 DIAGNOSIS — Z01.419 ENCOUNTER FOR GYNECOLOGICAL EXAMINATION (GENERAL) (ROUTINE) W/OUT ABNORMAL FINDINGS: ICD-10-CM

## 2024-04-24 DIAGNOSIS — N95.2 POSTMENOPAUSAL ATROPHIC VAGINITIS: ICD-10-CM

## 2024-04-24 DIAGNOSIS — Z12.31 ENCOUNTER FOR SCREENING MAMMOGRAM FOR MALIGNANT NEOPLASM OF BREAST: ICD-10-CM

## 2024-04-24 LAB
BILIRUB UR QL STRIP: NEGATIVE
CLARITY UR: CLEAR
COLLECTION METHOD: NORMAL
DATE COLLECTED: NORMAL
GLUCOSE UR-MCNC: NEGATIVE
HCG UR QL: 0 EU/DL
HEMOCCULT SP1 STL QL: NEGATIVE
HGB UR QL STRIP.AUTO: NEGATIVE
KETONES UR-MCNC: NEGATIVE
LEUKOCYTE ESTERASE UR QL STRIP: NEGATIVE
NITRITE UR QL STRIP: NEGATIVE
PH UR STRIP: 7
PROT UR STRIP-MCNC: NEGATIVE
QUALITY CONTROL: YES
SP GR UR STRIP: 1

## 2024-04-24 PROCEDURE — G0101: CPT

## 2024-04-26 ENCOUNTER — NON-APPOINTMENT (OUTPATIENT)
Age: 79
End: 2024-04-26

## 2024-04-26 LAB — CYTOLOGY CVX/VAG DOC THIN PREP: ABNORMAL

## 2024-04-26 NOTE — HISTORY OF PRESENT ILLNESS
[FreeTextEntry1] : Patient is a 78 yo female here today for annual visit. She denies any GYN complaints at this time.  hx of osteoporosis on boniva for 10 years and has been off for a while. Followed by [facility] endocrine for her bone density. hx of colon polyps liat.

## 2024-04-26 NOTE — PHYSICAL EXAM
[Chaperone Present] : A chaperone was present in the examining room during all aspects of the physical examination [Appropriately responsive] : appropriately responsive [Alert] : alert [No Acute Distress] : no acute distress [No Lymphadenopathy] : no lymphadenopathy [Soft] : soft [Non-tender] : non-tender [Non-distended] : non-distended [No HSM] : No HSM [No Lesions] : no lesions [No Mass] : no mass [Oriented x3] : oriented x3 [Examination Of The Breasts] : a normal appearance [No Discharge] : no discharge [No Masses] : no breast masses were palpable [Labia Majora] : normal [Labia Minora] : normal [Normal] : normal [Atrophy] : atrophy [Absent] : absent [Uterine Adnexae] : normal [FreeTextEntry2] : Sheila FNP-BC [Occult Blood Positive] : was negative for occult blood analysis

## 2024-04-26 NOTE — PLAN
[FreeTextEntry1] : Patient to follow up in 1 year for annual GYN exam Mammogram due: now Colonoscopy due: N/A, consider cologuard 4/26 Bone density due: Per endocrine group Pap ordered Hemoccult ordered  All questions answered, patient agreeable with plan.   I Lia Saini [facility] am scribing for the presence of Dr. Buckner the following sections HISTORY OF PRESENT ILLNESS, PAST MEDICAL/FAMILY/SOCIAL HISTORY; REVIEW OF SYSTEMS; VITAL SIGNS; PHYSICAL EXAM; DISPOSITION. I personally performed the services described in the documentation, reviewed the documentation recorded by the scribe in my presence and it accurately and completely records my words and actions.

## 2024-04-26 NOTE — DISCUSSION/SUMMARY
[FreeTextEntry1] : Daughter in law has multiple myeloma lives in [place] Sister was diagnosed with uterine CA.  youngest sister passed away 2/2 hysterectomy removal complications

## 2024-06-18 RX ORDER — ATENOLOL 25 MG/1
25 TABLET ORAL
Qty: 90 | Refills: 0 | Status: ACTIVE | COMMUNITY
Start: 1900-01-01 | End: 1900-01-01

## 2024-08-02 ENCOUNTER — NON-APPOINTMENT (OUTPATIENT)
Age: 79
End: 2024-08-02

## 2024-08-02 ENCOUNTER — APPOINTMENT (OUTPATIENT)
Dept: ELECTROPHYSIOLOGY | Facility: CLINIC | Age: 79
End: 2024-08-02
Payer: MEDICARE

## 2024-08-02 VITALS
BODY MASS INDEX: 24.64 KG/M2 | HEART RATE: 66 BPM | OXYGEN SATURATION: 100 % | HEIGHT: 64 IN | RESPIRATION RATE: 16 BRPM | WEIGHT: 144.31 LBS

## 2024-08-02 DIAGNOSIS — R00.2 PALPITATIONS: ICD-10-CM

## 2024-08-02 PROCEDURE — 93000 ELECTROCARDIOGRAM COMPLETE: CPT

## 2024-08-02 PROCEDURE — G2211 COMPLEX E/M VISIT ADD ON: CPT

## 2024-08-02 PROCEDURE — 99213 OFFICE O/P EST LOW 20 MIN: CPT

## 2024-08-02 NOTE — ASSESSMENT
[FreeTextEntry1] : 79-year-old female who has palpitations secondary to PACs. Lifestyle modification and beta blocker therapy have been successful in suppressing her symptoms. She has had a yearly Echo with Dr. Arzate within last year. Will obtain records.   Continue aspirin 81 mg and atenolol 37.5 mg daily.

## 2024-08-02 NOTE — HISTORY OF PRESENT ILLNESS
[FreeTextEntry1] : This is a 79-year-old woman with a history of near syncope and palpitations. Evaluation in the past has included Holter monitors that demonstrated her palpitations are associated with single PACs. She presents today for follow-up feeling well. She reports that she's having infrequent palpitation.    LOLITA MORALES denies chest pain, chest pressure, shortness of breath, lightheadedness, dizziness, syncope, presyncope, orthopnea, PND, or edema. Walks daily each morning.

## 2024-10-21 ENCOUNTER — RX RENEWAL (OUTPATIENT)
Age: 79
End: 2024-10-21

## 2025-02-06 ENCOUNTER — APPOINTMENT (OUTPATIENT)
Dept: ELECTROPHYSIOLOGY | Facility: CLINIC | Age: 80
End: 2025-02-06
Payer: MEDICARE

## 2025-02-06 VITALS
OXYGEN SATURATION: 100 % | SYSTOLIC BLOOD PRESSURE: 146 MMHG | DIASTOLIC BLOOD PRESSURE: 80 MMHG | HEART RATE: 69 BPM | HEIGHT: 64 IN | BODY MASS INDEX: 24.59 KG/M2 | WEIGHT: 144 LBS

## 2025-02-06 DIAGNOSIS — I48.0 PAROXYSMAL ATRIAL FIBRILLATION: ICD-10-CM

## 2025-02-06 DIAGNOSIS — I10 ESSENTIAL (PRIMARY) HYPERTENSION: ICD-10-CM

## 2025-02-06 DIAGNOSIS — R00.2 PALPITATIONS: ICD-10-CM

## 2025-02-06 PROCEDURE — 99214 OFFICE O/P EST MOD 30 MIN: CPT

## 2025-02-06 PROCEDURE — 93000 ELECTROCARDIOGRAM COMPLETE: CPT

## 2025-02-06 RX ORDER — DILTIAZEM HYDROCHLORIDE 120 MG/1
120 CAPSULE, COATED, EXTENDED RELEASE ORAL DAILY
Refills: 0 | Status: ACTIVE | COMMUNITY

## 2025-02-06 RX ORDER — APIXABAN 5 MG/1
5 TABLET, FILM COATED ORAL
Refills: 0 | Status: ACTIVE | COMMUNITY

## 2025-05-05 ENCOUNTER — APPOINTMENT (OUTPATIENT)
Dept: OBGYN | Facility: CLINIC | Age: 80
End: 2025-05-05
Payer: MEDICARE

## 2025-05-05 VITALS
BODY MASS INDEX: 24.75 KG/M2 | WEIGHT: 145 LBS | HEART RATE: 65 BPM | HEIGHT: 64 IN | DIASTOLIC BLOOD PRESSURE: 81 MMHG | SYSTOLIC BLOOD PRESSURE: 132 MMHG

## 2025-05-05 DIAGNOSIS — N95.2 POSTMENOPAUSAL ATROPHIC VAGINITIS: ICD-10-CM

## 2025-05-05 DIAGNOSIS — Z12.4 ENCOUNTER FOR SCREENING FOR MALIGNANT NEOPLASM OF CERVIX: ICD-10-CM

## 2025-05-05 DIAGNOSIS — Z01.419 ENCOUNTER FOR GYNECOLOGICAL EXAMINATION (GENERAL) (ROUTINE) W/OUT ABNORMAL FINDINGS: ICD-10-CM

## 2025-05-05 DIAGNOSIS — Z13.820 ENCOUNTER FOR SCREENING FOR OSTEOPOROSIS: ICD-10-CM

## 2025-05-05 DIAGNOSIS — Z12.31 ENCOUNTER FOR SCREENING MAMMOGRAM FOR MALIGNANT NEOPLASM OF BREAST: ICD-10-CM

## 2025-05-05 LAB
BILIRUB UR QL STRIP: NEGATIVE
CLARITY UR: CLEAR
COLLECTION METHOD: NORMAL
DATE COLLECTED: NORMAL
GLUCOSE UR-MCNC: NEGATIVE
HCG UR QL: 0 EU/DL
HEMOCCULT SP1 STL QL: POSITIVE
HGB UR QL STRIP.AUTO: NORMAL
KETONES UR-MCNC: NEGATIVE
LEUKOCYTE ESTERASE UR QL STRIP: NEGATIVE
NITRITE UR QL STRIP: NEGATIVE
PH UR STRIP: 5
PROT UR STRIP-MCNC: NEGATIVE
QUALITY CONTROL: YES
SP GR UR STRIP: 1

## 2025-05-05 PROCEDURE — 85018 HEMOGLOBIN: CPT | Mod: QW

## 2025-05-05 PROCEDURE — 81003 URINALYSIS AUTO W/O SCOPE: CPT | Mod: QW

## 2025-05-05 PROCEDURE — G0101: CPT

## 2025-05-05 PROCEDURE — 82270 OCCULT BLOOD FECES: CPT

## 2025-05-06 ENCOUNTER — APPOINTMENT (OUTPATIENT)
Dept: GASTROENTEROLOGY | Facility: CLINIC | Age: 80
End: 2025-05-06
Payer: MEDICARE

## 2025-05-06 VITALS
HEIGHT: 64 IN | SYSTOLIC BLOOD PRESSURE: 124 MMHG | WEIGHT: 145 LBS | BODY MASS INDEX: 24.75 KG/M2 | DIASTOLIC BLOOD PRESSURE: 74 MMHG

## 2025-05-06 DIAGNOSIS — R19.5 OTHER FECAL ABNORMALITIES: ICD-10-CM

## 2025-05-06 PROCEDURE — 99203 OFFICE O/P NEW LOW 30 MIN: CPT

## 2025-05-09 ENCOUNTER — NON-APPOINTMENT (OUTPATIENT)
Age: 80
End: 2025-05-09

## 2025-05-09 LAB — CYTOLOGY CVX/VAG DOC THIN PREP: NORMAL

## 2025-05-13 ENCOUNTER — APPOINTMENT (OUTPATIENT)
Dept: GASTROENTEROLOGY | Facility: CLINIC | Age: 80
End: 2025-05-13

## 2025-05-28 ENCOUNTER — NON-APPOINTMENT (OUTPATIENT)
Age: 80
End: 2025-05-28

## 2025-07-17 ENCOUNTER — APPOINTMENT (OUTPATIENT)
Dept: NEUROSURGERY | Facility: CLINIC | Age: 80
End: 2025-07-17

## 2025-07-17 VITALS
HEIGHT: 64 IN | DIASTOLIC BLOOD PRESSURE: 85 MMHG | WEIGHT: 198 LBS | BODY MASS INDEX: 33.8 KG/M2 | SYSTOLIC BLOOD PRESSURE: 160 MMHG | RESPIRATION RATE: 16 BRPM | HEART RATE: 63 BPM | OXYGEN SATURATION: 94 %

## 2025-07-17 VITALS
RESPIRATION RATE: 16 BRPM | SYSTOLIC BLOOD PRESSURE: 134 MMHG | HEART RATE: 63 BPM | BODY MASS INDEX: 33.8 KG/M2 | WEIGHT: 198 LBS | HEIGHT: 64 IN | DIASTOLIC BLOOD PRESSURE: 82 MMHG | OXYGEN SATURATION: 100 %

## 2025-08-11 ENCOUNTER — APPOINTMENT (OUTPATIENT)
Dept: NEUROSURGERY | Facility: CLINIC | Age: 80
End: 2025-08-11

## 2025-08-11 VITALS
OXYGEN SATURATION: 100 % | RESPIRATION RATE: 16 BRPM | DIASTOLIC BLOOD PRESSURE: 77 MMHG | BODY MASS INDEX: 33.8 KG/M2 | HEART RATE: 68 BPM | SYSTOLIC BLOOD PRESSURE: 143 MMHG | WEIGHT: 198 LBS | HEIGHT: 64 IN

## 2025-08-11 DIAGNOSIS — D32.0 BENIGN NEOPLASM OF CEREBRAL MENINGES: ICD-10-CM

## 2025-08-11 DIAGNOSIS — D32.1 BENIGN NEOPLASM OF SPINAL MENINGES: ICD-10-CM

## 2025-08-11 PROCEDURE — 99204 OFFICE O/P NEW MOD 45 MIN: CPT

## 2025-08-14 ENCOUNTER — APPOINTMENT (OUTPATIENT)
Dept: GASTROENTEROLOGY | Facility: AMBULATORY MEDICAL SERVICES | Age: 80
End: 2025-08-14
Payer: MEDICARE

## 2025-08-14 PROCEDURE — 45378 DIAGNOSTIC COLONOSCOPY: CPT

## 2025-08-22 ENCOUNTER — APPOINTMENT (OUTPATIENT)
Dept: ELECTROPHYSIOLOGY | Facility: CLINIC | Age: 80
End: 2025-08-22
Payer: MEDICARE

## 2025-08-22 VITALS
DIASTOLIC BLOOD PRESSURE: 70 MMHG | WEIGHT: 145 LBS | HEIGHT: 64 IN | SYSTOLIC BLOOD PRESSURE: 125 MMHG | OXYGEN SATURATION: 100 % | BODY MASS INDEX: 24.75 KG/M2 | HEART RATE: 65 BPM

## 2025-08-22 DIAGNOSIS — R00.2 PALPITATIONS: ICD-10-CM

## 2025-08-22 DIAGNOSIS — I10 ESSENTIAL (PRIMARY) HYPERTENSION: ICD-10-CM

## 2025-08-22 DIAGNOSIS — I48.0 PAROXYSMAL ATRIAL FIBRILLATION: ICD-10-CM

## 2025-08-22 PROCEDURE — 93000 ELECTROCARDIOGRAM COMPLETE: CPT

## 2025-08-22 PROCEDURE — 99214 OFFICE O/P EST MOD 30 MIN: CPT

## 2025-08-22 PROCEDURE — G2211 COMPLEX E/M VISIT ADD ON: CPT

## 2025-08-22 RX ORDER — DILTIAZEM HYDROCHLORIDE 120 MG/1
120 TABLET, COATED ORAL
Refills: 0 | Status: ACTIVE | COMMUNITY